# Patient Record
Sex: FEMALE | Race: WHITE | NOT HISPANIC OR LATINO | URBAN - METROPOLITAN AREA
[De-identification: names, ages, dates, MRNs, and addresses within clinical notes are randomized per-mention and may not be internally consistent; named-entity substitution may affect disease eponyms.]

---

## 2017-07-27 ENCOUNTER — TELEPHONE (OUTPATIENT)
Dept: HEMATOLOGY/ONCOLOGY | Facility: CLINIC | Age: 62
End: 2017-07-27

## 2017-07-28 NOTE — TELEPHONE ENCOUNTER
----- Message from Chrissie Franco sent at 7/26/2017  1:23 PM CDT -----  New Patient Referral. Patient is being referred by ANGELIQUE Mills for thrombocytopenia. She has medicaid insurance. Thanks

## 2017-08-11 ENCOUNTER — TELEPHONE (OUTPATIENT)
Dept: HEMATOLOGY/ONCOLOGY | Facility: CLINIC | Age: 62
End: 2017-08-11

## 2017-08-11 ENCOUNTER — OFFICE VISIT (OUTPATIENT)
Dept: HEMATOLOGY/ONCOLOGY | Facility: CLINIC | Age: 62
End: 2017-08-11
Payer: MEDICAID

## 2017-08-11 VITALS
DIASTOLIC BLOOD PRESSURE: 57 MMHG | WEIGHT: 224.5 LBS | RESPIRATION RATE: 18 BRPM | TEMPERATURE: 99 F | BODY MASS INDEX: 39.78 KG/M2 | HEART RATE: 91 BPM | HEIGHT: 63 IN | SYSTOLIC BLOOD PRESSURE: 101 MMHG

## 2017-08-11 DIAGNOSIS — D69.6 THROMBOCYTOPENIA: ICD-10-CM

## 2017-08-11 PROCEDURE — 3008F BODY MASS INDEX DOCD: CPT | Mod: ,,, | Performed by: INTERNAL MEDICINE

## 2017-08-11 PROCEDURE — 99204 OFFICE O/P NEW MOD 45 MIN: CPT | Mod: ,,, | Performed by: INTERNAL MEDICINE

## 2017-08-11 RX ORDER — DULOXETIN HYDROCHLORIDE 20 MG/1
30 CAPSULE, DELAYED RELEASE ORAL
COMMUNITY

## 2017-08-11 RX ORDER — GLIPIZIDE 5 MG/1
5 TABLET ORAL
COMMUNITY

## 2017-08-11 RX ORDER — PHENTERMINE HYDROCHLORIDE 37.5 MG/1
37.5 CAPSULE ORAL
COMMUNITY

## 2017-08-11 RX ORDER — SULFAMETHOXAZOLE AND TRIMETHOPRIM 800; 160 MG/1; MG/1
TABLET ORAL
COMMUNITY
Start: 2017-08-04

## 2017-08-11 RX ORDER — INSULIN GLARGINE 100 [IU]/ML
INJECTION, SOLUTION SUBCUTANEOUS
COMMUNITY
Start: 2017-01-05

## 2017-08-11 RX ORDER — RISPERIDONE 0.5 MG/1
0.5 TABLET ORAL
COMMUNITY
Start: 2017-07-31 | End: 2017-08-30

## 2017-08-11 RX ORDER — METFORMIN HYDROCHLORIDE 1000 MG/1
1000 TABLET ORAL
COMMUNITY
Start: 2017-01-04

## 2017-08-11 RX ORDER — LISINOPRIL AND HYDROCHLOROTHIAZIDE 20; 25 MG/1; MG/1
0.5 TABLET ORAL
COMMUNITY

## 2017-08-11 RX ORDER — LEVETIRACETAM 1000 MG/1
1000 TABLET ORAL
COMMUNITY
Start: 2017-08-08

## 2017-08-11 RX ORDER — CHOLESTYRAMINE 4 G/9G
POWDER, FOR SUSPENSION ORAL
COMMUNITY
Start: 2017-07-01

## 2017-08-11 RX ORDER — PREGABALIN 75 MG/1
75 CAPSULE ORAL
COMMUNITY
Start: 2017-07-31 | End: 2018-07-31

## 2017-08-11 NOTE — TELEPHONE ENCOUNTER
Spoke to patient. Clarified that she can take all of her meds on morning of labs and scan except for the diabetic med which can be resumed after.

## 2017-08-11 NOTE — TELEPHONE ENCOUNTER
----- Message from Zoraida Sibley sent at 8/11/2017  2:26 PM CDT -----  Pt called in said she had an appointment today with dr hernandez and was told to stop taking her diabetes medication before she gets blood work done on Monday at [place]. Pt wants to know if she could take her other medication before she gets labs done.? Please call back at [phone]

## 2017-08-12 NOTE — PROGRESS NOTES
[facility] History & Physical    Subjective:      Patient ID:   Nalini Machuca  62 y.o. female  1955  [facility]      Chief Complaint:   Low platelets.    HPI:  62 y.o. female with diagnosis of thrombocytopenia.   Hx HTN, DM, GERD, UTI, Cholesterol, osteoporosis, and neuropathy.    Hx cholecystectomy, back surgery x's 1, L & R carpal tunnel surgery, C spine surgery, T & A, R knee replacement,   Complete hysterectomy, DNC.    M1  Smoke < 1 ppd x's 40 years.  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  Dad Alzheimer's dx, RA, asbestos hx.  Br COPD.  Sister brain surgery, post op bleeding  Daughter HD  Daughter HTN  Sister HTN    ROS:   GEN: normal without any fever, night sweats or weight loss  HEENT: normal with no HA's, sore throat, stiff neck, changes in vision  CV: normal with no CP, SOB, PND, REICH or orthopnea  PULM: normal with no SOB, cough, hemoptysis, sputum or pleuritic pain  GI: normal with no abdominal pain, nausea, vomiting, constipation, diarrhea, melanotic stools, BRBPR, or hematemesis  : normal with no hematuria, dysuria  BREAST: normal with no mass, discharge, pain  SKIN: normal with no rash, erythema, bruising, or swelling     Review of patient's allergies indicates:  No Known Allergies  Social History     Social History    Marital status:      Spouse name: N/A    Number of children: N/A    Years of education: N/A     Occupational History    Not on file.     Social History Main Topics    Smoking status: Former Smoker     Packs/day: 0.50     Types: Cigarettes     Quit date:     Smokeless tobacco: Never Used    Alcohol use Not on file    Drug use: Unknown    Sexual activity: Not on file     Other Topics Concern    Not on file     Social History Narrative    No narrative on file         Current Outpatient Prescriptions:     insulin glargine (LANTUS SOLOSTAR) 100 unit/mL (3 mL) InPn pen, daily., Disp: , Rfl:     levetiracetam (KEPPRA) 1000 MG tablet, Take 1,000 mg by mouth.,  "Disp: , Rfl:     metformin (GLUCOPHAGE) 1000 MG tablet, Take 1,000 mg by mouth., Disp: , Rfl:     pregabalin (LYRICA) 75 MG capsule, Take 75 mg by mouth., Disp: , Rfl:     risperidone (RISPERDAL) 0.5 MG Tab, Take 0.5 mg by mouth., Disp: , Rfl:     cholestyramine, with sugar, 4 gram Powd, , Disp: , Rfl:     duloxetine (CYMBALTA) 20 MG capsule, Take 30 mg by mouth., Disp: , Rfl:     glipiZIDE (GLUCOTROL) 5 MG tablet, Take 5 mg by mouth., Disp: , Rfl:     lisinopril-hydrochlorothiazide (PRINZIDE,ZESTORETIC) 20-25 mg Tab, Take 0.5 tablets by mouth., Disp: , Rfl:     phentermine 37.5 MG capsule, Take 37.5 mg by mouth., Disp: , Rfl:     sulfamethoxazole-trimethoprim 800-160mg (BACTRIM DS) 800-160 mg Tab, , Disp: , Rfl:           Objective:   Vitals:  Blood pressure (!) 101/57, pulse 91, temperature 99 °F (37.2 °C), temperature source Oral, resp. rate 18, height 5' 3" (1.6 m), weight 101.8 kg (224 lb 8 oz).    Physical Examination:   GEN: no apparent distress, comfortable; AAOx3  HEAD: atraumatic and normocephalic  EYES: no pallor, no icterus, PERRLA  ENT: OMM, no pharyngeal erythema, external ears WNL; no nasal discharge; no thrush  NECK: no masses, thyroid normal, trachea midline, no LAD/LN's, supple  CV: RRR with no murmur; normal pulse; normal S1 and S2; no pedal edema  CHEST: Normal respiratory effort; CTAB; normal breath sounds; no wheeze or crackles  ABDOM: nontender and nondistended; soft; normal bowel sounds; no rebound/guarding  MUSC/Skeletal: ROM normal; no crepitus; joints normal  EXTREM: no clubbing, cyanosis, inflammation or swelling  SKIN: no rashes, lesions, ulcers, petechiae   : no waller  NEURO: grossly intact; motor/sensory WNL; AAOx3; no tremors  PSYCH: normal mood, affect and behavior  LYMPH: normal cervical, supraclavicular, axillary and groin LN's  BREASTS: NT, no palpable mass L or R breast      Labs:   plt cnt 55,000.  SGOT 48.    Radiology/Diagnostic Studies:    U/S " ordered    Assessment:   (1) 62 y.o. female with diagnosis of thrombocytopenia and slight increased LFT's.        Lab ordered to check for vitamen deficiency, hepatitis, ch liver dx, ITP, anti plt Ab.        U/S ordered to check L/S size.        ?may come to Bone Marrow exam?    (2)RTC 2-3 weeks.      Plan:       I have explained and the patient understands all of  the current recommendation(s). I have answered all of their questions to the best of my ability and to their complete satisfaction.

## 2017-09-25 ENCOUNTER — OFFICE VISIT (OUTPATIENT)
Dept: HEMATOLOGY/ONCOLOGY | Facility: CLINIC | Age: 62
End: 2017-09-25
Payer: MEDICAID

## 2017-09-25 VITALS
TEMPERATURE: 98 F | WEIGHT: 214.63 LBS | SYSTOLIC BLOOD PRESSURE: 113 MMHG | HEART RATE: 80 BPM | BODY MASS INDEX: 38.01 KG/M2 | DIASTOLIC BLOOD PRESSURE: 71 MMHG

## 2017-09-25 DIAGNOSIS — D51.8 ANEMIA OF DECREASED VITAMIN B12 ABSORPTION: Primary | ICD-10-CM

## 2017-09-25 PROCEDURE — 99214 OFFICE O/P EST MOD 30 MIN: CPT | Mod: ,,, | Performed by: INTERNAL MEDICINE

## 2017-09-25 PROCEDURE — 3008F BODY MASS INDEX DOCD: CPT | Mod: ,,, | Performed by: INTERNAL MEDICINE

## 2017-09-25 RX ORDER — CYANOCOBALAMIN 1000 UG/ML
1000 INJECTION, SOLUTION INTRAMUSCULAR; SUBCUTANEOUS
Status: CANCELLED | OUTPATIENT
Start: 2017-09-25

## 2017-09-25 NOTE — PROGRESS NOTES
[facility] History & Physical    Subjective:      Patient ID:   Nalini Machuca  62 y.o. female  1955  [facility]      Chief Complaint:   Low platelets.    HPI:  62 y.o. female with diagnosis of thrombocytopenia.   Hx HTN, DM, GERD, UTI, Cholesterol, osteoporosis, and neuropathy.    Hx cholecystectomy, back surgery x's 1, L & R carpal tunnel surgery, C spine surgery, T & A, R knee replacement,   Complete hysterectomy, DNC.    M1  Smoke < 1 ppd x's 40 years.  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  Dad Alzheimer's dx, RA, asbestos hx.  Br COPD.  Sister brain surgery, post op bleeding  Daughter HD  Daughter HTN  Sister HTN    ROS:   GEN: normal without any fever, night sweats or weight loss  HEENT: normal with no HA's, sore throat, stiff neck, changes in vision  CV: normal with no CP, SOB, PND, REICH or orthopnea  PULM: normal with no SOB, cough, hemoptysis, sputum or pleuritic pain  GI: normal with no abdominal pain, nausea, vomiting, constipation, diarrhea, melanotic stools, BRBPR, or hematemesis  : normal with no hematuria, dysuria  BREAST: normal with no mass, discharge, pain  SKIN: normal with no rash, erythema, bruising, or swelling     Review of patient's allergies indicates:  No Known Allergies  Social History     Social History    Marital status:      Spouse name: N/A    Number of children: N/A    Years of education: N/A     Occupational History    Not on file.     Social History Main Topics    Smoking status: Former Smoker     Packs/day: 0.50     Types: Cigarettes     Quit date:     Smokeless tobacco: Never Used    Alcohol use No    Drug use: Unknown    Sexual activity: Not on file     Other Topics Concern    Not on file     Social History Narrative    No narrative on file         Current Outpatient Prescriptions:     cholestyramine, with sugar, 4 gram Powd, , Disp: , Rfl:     duloxetine (CYMBALTA) 20 MG capsule, Take 30 mg by mouth., Disp: , Rfl:     glipiZIDE (GLUCOTROL) 5  MG tablet, Take 5 mg by mouth., Disp: , Rfl:     insulin glargine (LANTUS SOLOSTAR) 100 unit/mL (3 mL) InPn pen, daily., Disp: , Rfl:     levetiracetam (KEPPRA) 1000 MG tablet, Take 1,000 mg by mouth., Disp: , Rfl:     lisinopril-hydrochlorothiazide (PRINZIDE,ZESTORETIC) 20-25 mg Tab, Take 0.5 tablets by mouth., Disp: , Rfl:     metformin (GLUCOPHAGE) 1000 MG tablet, Take 1,000 mg by mouth., Disp: , Rfl:     phentermine 37.5 MG capsule, Take 37.5 mg by mouth., Disp: , Rfl:     pregabalin (LYRICA) 75 MG capsule, Take 75 mg by mouth., Disp: , Rfl:     risperidone (RISPERDAL) 0.5 MG Tab, Take 0.5 mg by mouth., Disp: , Rfl:     sulfamethoxazole-trimethoprim 800-160mg (BACTRIM DS) 800-160 mg Tab, , Disp: , Rfl:           Objective:   Vitals:  Blood pressure 113/71, pulse 80, temperature 97.8 °F (36.6 °C), weight 97.3 kg (214 lb 9.6 oz).    Physical Examination:   GEN: no apparent distress, comfortable  HEAD: atraumatic and normocephalic  EYES: no pallor, no icterus  ENT: no pharyngeal erythema, external ears WNL; no nasal discharge; no thrush  NECK: no masses, thyroid normal, trachea midline, no LAD/LN's, supple  CV: RRR with no murmur; normal pulse; normal S1 and S2; no pedal edema  CHEST: Normal respiratory effort; CTAB; normal breath sounds; no wheeze or crackles  ABDOM: nontender and nondistended; soft; normal bowel sounds; no rebound/guarding  MUSC/Skeletal: ROM normal; no crepitus; joints normal  EXTREM: no clubbing, cyanosis, inflammation or swelling  SKIN: no rashes, lesions, ulcers, petechiae   : no waller  NEURO: grossly intact; motor/sensory WNL;  no tremors  PSYCH: normal mood, affect and behavior  LYMPH: normal cervical, supraclavicular, axillary and groin LN's  BREASTS: NT, no palpable mass L or R breast      Labs:   plt cnt 55,000.  SGOT 48.    Radiology/Diagnostic Studies:    Ultrasound at [facility] shows mildly enlarged suspected fatty liver, spleen is normal in size.    emoglobin is  12.9, white blood count is 8100, platelet count at this time is 129,000.direct antiplatelet antibodies are negative. Hepatitis A B and C serology studies are negative.    Of note folate is low normal at 4.8. B-12 is low at 166.  And B-6 is orderline low at 3.8      Assessment/Plan:   (  1) 62 y.o. female with diagnosis of mild thrombocytopenia secondary to B-12 deficiency, multiple borderline low vitamin deficiencies, and some degree of hypersplenism secondary to fatty liver enlargement.    Start her on B-12 1000 µg subcutaneous monthly, first dose given in the office today. She will follow-up with her primary care nurse practitioner,Mrs. Galvez, for a monthly B-12 injection and get a follow-up CBC and platelet count in February, and I will see her back in March 2018.    She will begin a general multivitamin, 1 by mouth daily.    She will follow-up with Mrs. Galvez for any recommendations regarding her fatty liver and liver enlargement.

## 2017-09-25 NOTE — LETTER
September 25, 2017      Jac Rosas MD  [address]  [facility]  [place] [address]           [facility]  [address]  [address]  [place] [address]  Phone: [phone]  Fax: [phone]          Patient: Nalini Machuca   MR Number: [id number]   YOB: 1955   Date of Visit: 9/25/2017       Dear Dr. Jac Rosas:    Thank you for referring Nalini Machuca to me for evaluation. Attached you will find relevant portions of my assessment and plan of care.    If you have questions, please do not hesitate to call me. I look forward to following Nalini Machuca along with you.    Sincerely,    ROXY Giron MD    Enclosure  CC:  No Recipients    If you would like to receive this communication electronically, please contact [email or web address] or [phone] to request more information on [employer] access.    For providers and/or their staff who would like to refer a patient to [facility], please contact us through our one-stop-shop provider referral line, [facility], [place], at [phone].    If you feel you have received this communication in error or would no longer like to receive these types of communications, please e-mail [email or web address]

## 2018-03-01 ENCOUNTER — OFFICE VISIT (OUTPATIENT)
Dept: HEMATOLOGY/ONCOLOGY | Facility: CLINIC | Age: 63
End: 2018-03-01
Payer: MEDICAID

## 2018-03-01 VITALS
TEMPERATURE: 98 F | BODY MASS INDEX: 38.44 KG/M2 | SYSTOLIC BLOOD PRESSURE: 90 MMHG | RESPIRATION RATE: 20 BRPM | DIASTOLIC BLOOD PRESSURE: 59 MMHG | WEIGHT: 217 LBS | HEART RATE: 89 BPM

## 2018-03-01 DIAGNOSIS — D51.8 ANEMIA OF DECREASED VITAMIN B12 ABSORPTION: Primary | ICD-10-CM

## 2018-03-01 DIAGNOSIS — D69.6 THROMBOCYTOPENIA: ICD-10-CM

## 2018-03-01 PROCEDURE — 99213 OFFICE O/P EST LOW 20 MIN: CPT | Mod: ,,, | Performed by: INTERNAL MEDICINE

## 2018-03-01 RX ORDER — TOPIRAMATE 100 MG/1
100 TABLET, FILM COATED ORAL 2 TIMES DAILY
COMMUNITY

## 2018-03-01 RX ORDER — ROSUVASTATIN CALCIUM 20 MG/1
20 TABLET, COATED ORAL
COMMUNITY
Start: 2017-12-21 | End: 2018-12-21

## 2018-03-01 NOTE — PROGRESS NOTES
[facility] History & Physical    Subjective:      Patient ID:   Nalini Machuca  62 y.o. female  1955  [facility]      Chief Complaint:   Low platelets.    HPI:  62 y.o. female with diagnosis of thrombocytopenia.   Hx HTN, DM, GERD, UTI, Cholesterol, osteoporosis, and neuropathy.    Hx cholecystectomy, back surgery x's 1, L & R carpal tunnel surgery, C spine surgery, T & A, R knee replacement,   Complete hysterectomy, DNC.    M1  Smoke < 1 ppd x's 40 years.  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  Dad Alzheimer's dx, RA, asbestos hx.  Br COPD.  Sister brain surgery, post op bleeding  Daughter HD  Daughter HTN  Sister HTN    ROS:   GEN: normal without any fever, night sweats or weight loss  HEENT: normal with no HA's, sore throat, stiff neck, changes in vision  CV: normal with no CP, SOB, PND, REICH or orthopnea  PULM: normal with no SOB, cough, hemoptysis, sputum or pleuritic pain  GI: normal with no abdominal pain, nausea, vomiting, constipation, diarrhea, melanotic stools, BRBPR, or hematemesis  : normal with no hematuria, dysuria  BREAST: normal with no mass, discharge, pain  SKIN: normal with no rash, erythema, bruising, or swelling     Review of patient's allergies indicates:  No Known Allergies  Social History     Social History    Marital status:      Spouse name: N/A    Number of children: N/A    Years of education: N/A     Occupational History    Not on file.     Social History Main Topics    Smoking status: Former Smoker     Packs/day: 0.50     Types: Cigarettes     Quit date:     Smokeless tobacco: Never Used    Alcohol use No    Drug use: Unknown    Sexual activity: Not on file     Other Topics Concern    Not on file     Social History Narrative    No narrative on file         Current Outpatient Prescriptions:     cholestyramine, with sugar, 4 gram Powd, , Disp: , Rfl:     duloxetine (CYMBALTA) 20 MG capsule, Take 30 mg by mouth., Disp: , Rfl:     glipiZIDE (GLUCOTROL) 5  MG tablet, Take 5 mg by mouth., Disp: , Rfl:     levetiracetam (KEPPRA) 1000 MG tablet, Take 1,000 mg by mouth., Disp: , Rfl:     lisinopril-hydrochlorothiazide (PRINZIDE,ZESTORETIC) 20-25 mg Tab, Take 0.5 tablets by mouth., Disp: , Rfl:     metformin (GLUCOPHAGE) 1000 MG tablet, Take 1,000 mg by mouth., Disp: , Rfl:     pregabalin (LYRICA) 75 MG capsule, Take 75 mg by mouth., Disp: , Rfl:     rosuvastatin (CRESTOR) 20 MG tablet, Take 20 mg by mouth., Disp: , Rfl:     topiramate (TOPAMAX) 100 MG tablet, Take 100 mg by mouth 2 (two) times daily., Disp: , Rfl:     insulin glargine (LANTUS SOLOSTAR) 100 unit/mL (3 mL) InPn pen, daily., Disp: , Rfl:     phentermine 37.5 MG capsule, Take 37.5 mg by mouth., Disp: , Rfl:     risperidone (RISPERDAL) 0.5 MG Tab, Take 0.5 mg by mouth., Disp: , Rfl:     sulfamethoxazole-trimethoprim 800-160mg (BACTRIM DS) 800-160 mg Tab, , Disp: , Rfl:           Objective:   Vitals:  Blood pressure (!) 90/59, pulse 89, temperature 97.7 °F (36.5 °C), resp. rate 20, weight 98.4 kg (217 lb).    Physical Examination:   GEN: no apparent distress, comfortable  HEAD: atraumatic and normocephalic  EYES: no pallor, no icterus  ENT: no pharyngeal erythema, external ears WNL; no nasal discharge; no thrush  NECK: no masses, thyroid normal, trachea midline, no LAD/LN's, supple  CV: RRR with no murmur; normal pulse; normal S1 and S2; no pedal edema  CHEST: Normal respiratory effort; CTAB; normal breath sounds; no wheeze or crackles  ABDOM: nontender and nondistended; soft; normal bowel sounds; no rebound/guarding  MUSC/Skeletal: ROM normal; no crepitus; joints normal  EXTREM: no clubbing, cyanosis, inflammation or swelling  SKIN: no rashes, lesions, ulcers, petechiae   : no waller  NEURO: grossly intact; motor/sensory WNL;  no tremors  PSYCH: normal mood, affect and behavior  LYMPH: normal cervical, supraclavicular, axillary and groin LN's  BREASTS: NT, no palpable mass L or R breast      Labs:    plt cnt 55,000.  SGOT 48.    Radiology/Diagnostic Studies:    Ultrasound at [facility] shows mildly enlarged suspected fatty liver, spleen is normal in size.    emoglobin is 12.9, white blood count is 8100, platelet count at this time is 129,000.direct antiplatelet antibodies are negative. Hepatitis A B and C serology studies are negative.    Of note folate is low normal at 4.8. B-12 is low at 166.  And B-6 is orderline low at 3.8      Assessment/Plan:   (  1) 62 y.o. female with diagnosis of mild thrombocytopenia secondary to B-12 deficiency, multiple borderline low vitamin deficiencies, and some degree of hypersplenism secondary to fatty liver enlargement.    I started  her on B-12 1000 µg subcutaneous monthly. She will follow-up with her primary care nurse practitioner,Mrs. Galvez, for a monthly B-12 injection.    Check CBC, B 12 3/2018, see me 3/29/2018.    Continue a general multivitamin, 1 by mouth daily.    She will follow-up with Mrs. Galvez for any recommendations regarding her fatty liver and liver enlargement.

## 2018-03-29 ENCOUNTER — OFFICE VISIT (OUTPATIENT)
Dept: HEMATOLOGY/ONCOLOGY | Facility: CLINIC | Age: 63
End: 2018-03-29
Payer: MEDICAID

## 2018-03-29 VITALS
DIASTOLIC BLOOD PRESSURE: 68 MMHG | SYSTOLIC BLOOD PRESSURE: 107 MMHG | BODY MASS INDEX: 38.44 KG/M2 | HEART RATE: 72 BPM | WEIGHT: 217 LBS | TEMPERATURE: 99 F | RESPIRATION RATE: 20 BRPM

## 2018-03-29 DIAGNOSIS — D51.8 ANEMIA OF DECREASED VITAMIN B12 ABSORPTION: ICD-10-CM

## 2018-03-29 DIAGNOSIS — Z12.31 ENCOUNTER FOR SCREENING MAMMOGRAM FOR BREAST CANCER: Primary | ICD-10-CM

## 2018-03-29 DIAGNOSIS — D69.6 THROMBOCYTOPENIA: ICD-10-CM

## 2018-03-29 PROCEDURE — 99214 OFFICE O/P EST MOD 30 MIN: CPT | Mod: ,,, | Performed by: INTERNAL MEDICINE

## 2018-03-29 RX ORDER — CYANOCOBALAMIN 1000 UG/ML
INJECTION, SOLUTION INTRAMUSCULAR; SUBCUTANEOUS
COMMUNITY

## 2018-03-29 RX ORDER — CEFUROXIME AXETIL 500 MG/1
500 TABLET ORAL
COMMUNITY
Start: 2018-03-26 | End: 2018-04-02

## 2018-03-29 RX ORDER — TOPIRAMATE 100 MG/1
100 TABLET, FILM COATED ORAL
COMMUNITY
Start: 2018-02-08

## 2018-03-29 RX ORDER — DULOXETIN HYDROCHLORIDE 30 MG/1
1 CAPSULE, DELAYED RELEASE ORAL
COMMUNITY
Start: 2017-10-02

## 2018-03-29 RX ORDER — DOXYCYCLINE 100 MG/1
100 CAPSULE ORAL
COMMUNITY
Start: 2018-03-26 | End: 2018-04-02

## 2018-03-29 RX ORDER — PREGABALIN 75 MG/1
75 CAPSULE ORAL
COMMUNITY
Start: 2018-02-02

## 2018-03-29 RX ORDER — BUTALBITAL, ACETAMINOPHEN AND CAFFEINE 50; 325; 40 MG/1; MG/1; MG/1
1 TABLET ORAL
COMMUNITY
Start: 2017-12-05 | End: 2018-12-05

## 2018-03-29 RX ORDER — MELOXICAM 7.5 MG/1
15 TABLET ORAL
COMMUNITY
Start: 2017-08-12 | End: 2018-08-12

## 2018-03-29 NOTE — PROGRESS NOTES
[facility] History & Physical    Subjective:      Patient ID:   Nalini Machuca  63 y.o. female  1955  [facility]      Chief Complaint:   Low platelets.    HPI:  63 y.o. female with diagnosis of thrombocytopenia.   Hx HTN, DM, GERD, UTI, Cholesterol, osteoporosis, and neuropathy.  Hx B 12 deficiency, on B 12 shots.  Has fatty liver and splenomegaly.  Recent pneumonia.    Hx cholecystectomy, back surgery x's 1, L & R carpal tunnel surgery, C spine surgery, T & A, R knee replacement,   Complete hysterectomy, DNC.    M1  Smoke < 1 ppd x's 40 years.  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  Dad Alzheimer's dx, RA, asbestos hx.  Br COPD.  Sister brain surgery, post op bleeding  Daughter HD  Daughter HTN  Sister HTN    ROS:   GEN: normal without any fever, night sweats or weight loss  HEENT: normal with no HA's, sore throat, stiff neck, changes in vision  CV: normal with no CP, SOB, PND, REICH or orthopnea  PULM: See HPI  GI: normal with no abdominal pain, nausea, vomiting, constipation, diarrhea, melanotic stools, BRBPR, or hematemesis  : normal with no hematuria, dysuria  BREAST: normal with no mass, discharge, pain  SKIN: normal with no rash, erythema, bruising, or swelling     Review of patient's allergies indicates:  No Known Allergies  Social History     Social History    Marital status:      Spouse name: N/A    Number of children: N/A    Years of education: N/A     Occupational History    Not on file.     Social History Main Topics    Smoking status: Former Smoker     Packs/day: 0.50     Types: Cigarettes     Quit date:     Smokeless tobacco: Never Used    Alcohol use No    Drug use: Unknown    Sexual activity: Not on file     Other Topics Concern    Not on file     Social History Narrative    No narrative on file         Current Outpatient Prescriptions:     BLOOD SUGAR DIAGNOSTIC (JULIEN BLOOD GLUCOSE TEST STRIP MISC), Use as instructed, Disp: , Rfl:     cefUROXime (CEFTIN) 500 MG  tablet, Take 500 mg by mouth., Disp: , Rfl:     cholestyramine, with sugar, 4 gram Powd, , Disp: , Rfl:     cyanocobalamin 1,000 mcg/mL injection, Inject as directed every 30 (thirty) days., Disp: , Rfl:     doxycycline (VIBRAMYCIN) 100 MG Cap, Take 100 mg by mouth., Disp: , Rfl:     duloxetine (CYMBALTA) 20 MG capsule, Take 30 mg by mouth., Disp: , Rfl:     insulin glargine (LANTUS SOLOSTAR) 100 unit/mL (3 mL) InPn pen, daily., Disp: , Rfl:     levetiracetam (KEPPRA) 1000 MG tablet, Take 1,000 mg by mouth., Disp: , Rfl:     lisinopril-hydrochlorothiazide (PRINZIDE,ZESTORETIC) 20-25 mg Tab, Take 0.5 tablets by mouth., Disp: , Rfl:     meloxicam (MOBIC) 7.5 MG tablet, Take 15 mg by mouth., Disp: , Rfl:     metformin (GLUCOPHAGE) 1000 MG tablet, Take 1,000 mg by mouth., Disp: , Rfl:     pregabalin (LYRICA) 75 MG capsule, Take 75 mg by mouth., Disp: , Rfl:     rosuvastatin (CRESTOR) 20 MG tablet, Take 20 mg by mouth., Disp: , Rfl:     topiramate (TOPAMAX) 100 MG tablet, Take 100 mg by mouth 2 (two) times daily., Disp: , Rfl:     butalbital-acetaminophen-caffeine -40 mg (FIORICET, ESGIC) -40 mg per tablet, Take 1 tablet by mouth., Disp: , Rfl:     DULoxetine (CYMBALTA) 30 MG capsule, Take 1 capsule by mouth., Disp: , Rfl:     glipiZIDE (GLUCOTROL) 5 MG tablet, Take 5 mg by mouth., Disp: , Rfl:     phentermine 37.5 MG capsule, Take 37.5 mg by mouth., Disp: , Rfl:     pregabalin (LYRICA) 75 MG capsule, Take 75 mg by mouth., Disp: , Rfl:     risperidone (RISPERDAL) 0.5 MG Tab, Take 0.5 mg by mouth., Disp: , Rfl:     sulfamethoxazole-trimethoprim 800-160mg (BACTRIM DS) 800-160 mg Tab, , Disp: , Rfl:     topiramate (TOPAMAX) 100 MG tablet, Take 100 mg by mouth., Disp: , Rfl:           Objective:   Vitals:  Blood pressure 107/68, pulse 72, temperature 98.5 °F (36.9 °C), resp. rate 20, weight 98.4 kg (217 lb).    Physical Examination:   GEN: no apparent distress, comfortable  HEAD: atraumatic  and normocephalic  EYES: no pallor, no icterus  ENT: no pharyngeal erythema, external ears WNL; no nasal discharge; no thrush  NECK: no masses, thyroid normal, trachea midline, no LAD/LN's, supple  CV: RRR with no murmur; normal pulse; normal S1 and S2; no pedal edema  CHEST: Normal respiratory effort; CTAB; normal breath sounds; no wheeze or crackles  ABDOM: nontender and nondistended; soft; normal bowel sounds; no rebound/guarding  MUSC/Skeletal: ROM normal; no crepitus; joints normal  EXTREM: no clubbing, cyanosis, inflammation or swelling  SKIN: no rashes, lesions, ulcers, petechiae   : no waller  NEURO: grossly intact; motor/sensory WNL;  no tremors  PSYCH: normal mood, affect and behavior  LYMPH: normal cervical, supraclavicular, axillary and groin LN's  BREASTS: NT, no palpable mass L or R breast      Labs:   plt cnt stable at 93,000.    Radiology/Diagnostic Studies:    Ultrasound at [facility] shows mildly enlarged suspected fatty liver, spleen is normal in size.    Hemoglobin is 12.2, white blood count is 8100, direct antiplatelet antibodies are negative. Hepatitis A B and C serology studies are negative.    Of note folate is low normal at 4.8. B-12 is low at 166.  And B-6 is borderline low at 3.8      Assessment/Plan:   (  1) 63 y.o. female with diagnosis of mild thrombocytopenia secondary to B-12 deficiency, multiple borderline low vitamin deficiencies, and some degree of hypersplenism secondary to fatty liver enlargement.    I started  her on B-12 1000 µg subcutaneous monthly. She will follow-up with her primary care nurse practitioner,Mrs. Galvez, for a monthly B-12 injection.    Continue a general multivitamin, 1 by mouth daily.    She will follow-up with Mrs. Galvez for any recommendations regarding her fatty liver and liver enlargement.    RTC 6 months with CBC.